# Patient Record
Sex: MALE | Race: BLACK OR AFRICAN AMERICAN | NOT HISPANIC OR LATINO | URBAN - METROPOLITAN AREA
[De-identification: names, ages, dates, MRNs, and addresses within clinical notes are randomized per-mention and may not be internally consistent; named-entity substitution may affect disease eponyms.]

---

## 2020-09-01 ENCOUNTER — EMERGENCY (EMERGENCY)
Facility: HOSPITAL | Age: 60
LOS: 1 days | Discharge: HOME | End: 2020-09-01
Attending: EMERGENCY MEDICINE | Admitting: EMERGENCY MEDICINE
Payer: MEDICAID

## 2020-09-01 VITALS
TEMPERATURE: 98 F | DIASTOLIC BLOOD PRESSURE: 86 MMHG | SYSTOLIC BLOOD PRESSURE: 151 MMHG | HEART RATE: 57 BPM | OXYGEN SATURATION: 98 % | RESPIRATION RATE: 18 BRPM

## 2020-09-01 VITALS
HEART RATE: 78 BPM | OXYGEN SATURATION: 99 % | TEMPERATURE: 98 F | RESPIRATION RATE: 18 BRPM | DIASTOLIC BLOOD PRESSURE: 79 MMHG | SYSTOLIC BLOOD PRESSURE: 117 MMHG

## 2020-09-01 PROCEDURE — 99283 EMERGENCY DEPT VISIT LOW MDM: CPT

## 2020-09-01 RX ORDER — IBUPROFEN 200 MG
600 TABLET ORAL ONCE
Refills: 0 | Status: COMPLETED | OUTPATIENT
Start: 2020-09-01 | End: 2020-09-01

## 2020-09-01 RX ORDER — OXYCODONE AND ACETAMINOPHEN 5; 325 MG/1; MG/1
1 TABLET ORAL ONCE
Refills: 0 | Status: DISCONTINUED | OUTPATIENT
Start: 2020-09-01 | End: 2020-09-01

## 2020-09-01 RX ORDER — ACETAMINOPHEN 500 MG
975 TABLET ORAL ONCE
Refills: 0 | Status: DISCONTINUED | OUTPATIENT
Start: 2020-09-01 | End: 2020-09-01

## 2020-09-01 RX ORDER — ACETAMINOPHEN 500 MG
1000 TABLET ORAL ONCE
Refills: 0 | Status: COMPLETED | OUTPATIENT
Start: 2020-09-01 | End: 2020-09-01

## 2020-09-01 RX ADMIN — Medication 1000 MILLIGRAM(S): at 16:40

## 2020-09-01 RX ADMIN — OXYCODONE AND ACETAMINOPHEN 1 TABLET(S): 5; 325 TABLET ORAL at 12:44

## 2020-09-01 RX ADMIN — Medication 600 MILLIGRAM(S): at 16:06

## 2020-09-01 RX ADMIN — OXYCODONE AND ACETAMINOPHEN 1 TABLET(S): 5; 325 TABLET ORAL at 16:06

## 2020-09-01 RX ADMIN — Medication 600 MILLIGRAM(S): at 16:28

## 2020-09-01 RX ADMIN — OXYCODONE AND ACETAMINOPHEN 1 TABLET(S): 5; 325 TABLET ORAL at 16:00

## 2020-09-01 RX ADMIN — Medication 600 MILLIGRAM(S): at 12:43

## 2020-09-01 NOTE — ED PROVIDER NOTE - PHYSICAL EXAMINATION
GENERAL: Awake, alert, NAD  HEENT: Upper dentures. Left upper incisor eroded to enamel, some blood, no drainage. No gum involvement. No oral lesions. NC/AT, moist mucous membranes, PERRL, EOMI. No trismus, no voice changes, no drooling.  LUNGS: CTAB, no wheezes or crackles   CARDIAC: RRR, no m/r/g  ABDOMEN: Soft, normal BS, non tender, non distended, no rebound, no guarding  BACK: No midline spinal tenderness, no CVA tenderness  EXT: No edema, no calf tenderness, 2+ DP pulses bilaterally, no deformities.  NEURO: A&Ox3. Moving all extremities.  SKIN: Warm and dry. No rash.  PSYCH: Normal affect.

## 2020-09-01 NOTE — ED PROVIDER NOTE - CLINICAL SUMMARY MEDICAL DECISION MAKING FREE TEXT BOX
Ilan: L upper incisor pain, has dentist (but too far way from where he lives). No F/phonation problems/swelling. Poor dentition. Plan: pain control, call Dental.

## 2020-09-01 NOTE — ED PROVIDER NOTE - PATIENT PORTAL LINK FT
You can access the FollowMyHealth Patient Portal offered by [facility] by registering at the following website: [email or web address]. By joining [facility]’s FollowMyHealth portal, you will also be able to view your health information using other applications (apps) compatible with our system.

## 2020-09-01 NOTE — ED PROVIDER NOTE - OBJECTIVE STATEMENT
60 year old male no PMH BIBEMS for evaluation of acute onset tooth pain. States pain in his left incisor tooth woke him up from sleep at 4am. Went back to bed and awoke with pain again. A friend at his residence gave him some pain pills (Motrin?) which his is unsure of the name, gave him some relief. Has upper dentures and has a dentist in [facility] but has not followed with him in a long time. Denies bleeding, drainage, drooling, dysphagia, voice changes, neck swelling, HA, blurry vision, fevers, chills.

## 2020-09-01 NOTE — ED PROVIDER NOTE - PROGRESS NOTE DETAILS
Shreya Coronado PGY-1: Dental paged. Shreya Coronado PGY-1: Spoke with dental. Will come evaluate patient. Shreya Coronado PGY-1: Re-evaluated patient. States pain is starting to come back but is tolerable. Declined more pain medications at this time. Will re-evaluate patient. Dental consult pending. Shreya Coronado PGY-1: Re-evaluated patient. Pain remains tolerable. Dental consult pending. Shreya Coronado PGY-1: Dental evaluated patient. States he requires tooth extraction, and has follow up with regular dentist tomorrow. No evidence of infection. Will re-dose pain medication. DC with dental follow up. Shreya Coronado PGY-1:  Pt was re-evaluated at bedside, VSS, feeling better overall. Results were discussed with patient as well as return precautions and follow up plan with dental. Time was taken to answer any questions that the patient had before providing them with discharge paperwork.

## 2020-09-01 NOTE — ED PROVIDER NOTE - NS ED ROS FT
CONST: no fevers, no chills  EYES: no pain, no vision changes  ENT: no sore throat, no ear pain, no change in hearing  ORAL: + dental pain  CV: no chest pain, no leg swelling  RESP: no shortness of breath, no cough  ABD: no abdominal pain, no nausea, no vomiting, no diarrhea  : no dysuria, no flank pain, no hematuria  MSK: no back pain, no extremity pain  NEURO: no headache or additional neurologic complaints  HEME: no easy bleeding  SKIN:  no rash

## 2020-09-01 NOTE — ED PROVIDER NOTE - ATTENDING CONTRIBUTION TO CARE
I performed a face-to-face evaluation of the patient and performed a history and physical examination. I agree with the history and physical examination.    L upper incisor pain, has dentist (but too far way from where he lives). No F/phonation problems/swelling. Poor dentition. Plan: pain control, call Dental.

## 2020-09-01 NOTE — ED ADULT TRIAGE NOTE - CHIEF COMPLAINT QUOTE
pt coming from [facility] field iNN by  EMS for left tooth area pain, pt stated tooth broke and now has pain pt have not f/u with dentis x few yrs.

## 2020-09-01 NOTE — PROGRESS NOTE ADULT - SUBJECTIVE AND OBJECTIVE BOX
Patient is a 60y old Male who presents with a chief complaint of too pain    HPI: Patient reports throbbing 10/10 pain starting this morning and getting progressively worse.    PAST MEDICAL & SURGICAL HISTORY: Patient denies any past medical or surgical history.      Allergies    No Known Allergies    SOCIAL HISTORY: Patient reports being a patient of record at [facility] dental [facility].  Patient was visiting the area when the pain started this morning and decided to visit the ED.     Vital Signs Last 24 Hrs  T(C): 36.8 (01 Sep 2020 16:01), Max: 36.8 (01 Sep 2020 16:01)  T(F): 98.2 (01 Sep 2020 16:01), Max: 98.2 (01 Sep 2020 16:01)  HR: 57 (01 Sep 2020 16:01) (57 - 78)  BP: 151/86 (01 Sep 2020 16:01) (117/79 - 151/86)  BP(mean): --  RR: 18 (01 Sep 2020 16:01) (18 - 18)  SpO2: 98% (01 Sep 2020 16:01) (98% - 99%)    EOE:     Mandible FROM             Facial bones and MOM grossly intact             ( - ) trismus             ( - ) LAD             ( - ) swelling             ( - ) asymmetry             ( - ) palpation             ( - ) SOB             ( - ) dysphagia             ( - ) LOC    IOE:  permanent dentition: multiple missing teeth          labial/buccal mucosa: WNL - No draining sinus tract present            ( + ) percussion - #11           ( + ) palpation -  #11           ( - ) swelling   Patient wears maxillary partial denture with three abutment teeth.  #11 is the patient's source of pain with no teeth mesial or distal to it.  Grade II mobility #11.  #11 is an abutment for maxillary partial denture.    No signs of active infection.     Caries: #11    DENTAL RADIOGRAPHS: 1 PA taken and interpreted #11.  Large cervical lesions present #11.  No PARL or widened PDL present.  No teeth adjacent to #11.      ASSESSMENT: #11 non-restorable due to caries.  Patient reports intent to visit dental clinic he visits regularly for definitive treatment #11.      PROCEDURE:  Verbal consent given.   Limited oral exam and 1 PA radiograph completed.  No emergent treatment necessary at this time.    RECOMMENDATIONS:  1) Pain medication per ED team. No antibiotics necessary at this time.  2) Dental F/U with outpatient dentist for comprehensive dental care.    3) If any difficulty swallowing/breathing, fever occur, page dental.     Keegan Mixon DMD #[id number]

## 2020-09-01 NOTE — ED PROVIDER NOTE - NSFOLLOWUPINSTRUCTIONS_ED_ALL_ED_FT
You were evaluated in the emergency department for dental pain. The dental team saw you and your tooth needs to be extracted. You should follow up with your regular dentist in clinic tomorrow.     You can alternate Tylenol and Motrin as needed for pain. You can use 500-1000mg Tylenol every 6 hours for pain - as needed. TYou can use 400-600mg Ibuprofen (such as motrin or advil) every 6 to 8 hours as needed for pain control.  Take ibuprofen with food or milk to lessen stomach upset.  There are over-the-counter medications please respect the warnings on the label. All medications come with certain risks and side effects that you need to discuss with your doctor, especially if you are taking them for a prolonged period.     Please return to the emergency department with any new or worsening symptoms including:  -High fever  -Severe pain not controlled with over the counter medications  -Headache  -Blurry vision  -Voice changes  -Neck stiffness  -Bleeding or pus drainage from the tooth You were evaluated in the emergency department for dental pain. The dental team saw you and your tooth needs to be extracted. You should follow up with your regular dentist in clinic tomorrow.     You can alternate Tylenol and Motrin as needed for pain. You can use 500-1000mg Tylenol every 6 hours for pain - as needed. You can use 400-600mg Ibuprofen (such as motrin or advil) every 6 to 8 hours as needed for pain control.  Take ibuprofen with food or milk to lessen stomach upset.  There are over-the-counter medications please respect the warnings on the label. All medications come with certain risks and side effects that you need to discuss with your doctor, especially if you are taking them for a prolonged period.     Please return to the emergency department with any new or worsening symptoms including:  -High fever  -Severe pain not controlled with over the counter medications  -Headache  -Blurry vision  -Voice changes  -Neck stiffness  -Bleeding or pus drainage from the tooth